# Patient Record
Sex: MALE | Race: WHITE | URBAN - METROPOLITAN AREA
[De-identification: names, ages, dates, MRNs, and addresses within clinical notes are randomized per-mention and may not be internally consistent; named-entity substitution may affect disease eponyms.]

---

## 2017-10-17 ENCOUNTER — TELEPHONE (OUTPATIENT)
Dept: PRIMARY CARE CLINIC | Age: 67
End: 2017-10-17

## 2017-10-17 NOTE — TELEPHONE ENCOUNTER
----- Message from Joan Kelley MA sent at 10/17/2017  1:34 PM EDT -----  Contact: Daniella Ryan      ----- Message -----  From: Maira Estevanad  Sent: 10/17/2017  12:54 PM  To: [facility]    Patient is calling to get established with . He is a close neighbor and  told him to come in to be seen. He has [employer]. He goes By [email or web address] and stated  would know him by this name. He is hoping to get in sooner then later. He has had a chronic Sinus infection now accompanying headaches that don't seem to want to go away. He is not really sure what's going on. Can this patient establish?

## 2017-10-27 ENCOUNTER — OFFICE VISIT (OUTPATIENT)
Dept: PRIMARY CARE CLINIC | Age: 67
End: 2017-10-27

## 2017-10-27 VITALS
OXYGEN SATURATION: 98 % | DIASTOLIC BLOOD PRESSURE: 80 MMHG | WEIGHT: 205 LBS | SYSTOLIC BLOOD PRESSURE: 120 MMHG | HEART RATE: 73 BPM | HEIGHT: 73 IN | BODY MASS INDEX: 27.17 KG/M2

## 2017-10-27 DIAGNOSIS — J32.2 CHRONIC ETHMOIDAL SINUSITIS: ICD-10-CM

## 2017-10-27 DIAGNOSIS — Z12.11 COLON CANCER SCREENING: ICD-10-CM

## 2017-10-27 DIAGNOSIS — Z00.00 MEDICARE ANNUAL WELLNESS VISIT, INITIAL: Primary | ICD-10-CM

## 2017-10-27 DIAGNOSIS — Z87.891 FORMER SMOKER: ICD-10-CM

## 2017-10-27 LAB
A/G RATIO: 1.4 (ref 1.1–2.2)
ALBUMIN SERPL-MCNC: 4.2 G/DL (ref 3.4–5)
ALP BLD-CCNC: 121 U/L (ref 40–129)
ALT SERPL-CCNC: 33 U/L (ref 10–40)
ANION GAP SERPL CALCULATED.3IONS-SCNC: 14 MMOL/L (ref 3–16)
AST SERPL-CCNC: 26 U/L (ref 15–37)
BILIRUB SERPL-MCNC: 0.4 MG/DL (ref 0–1)
BUN BLDV-MCNC: 22 MG/DL (ref 7–20)
CALCIUM SERPL-MCNC: 9.7 MG/DL (ref 8.3–10.6)
CHLORIDE BLD-SCNC: 102 MMOL/L (ref 99–110)
CHOLESTEROL, TOTAL: 191 MG/DL (ref 0–199)
CO2: 26 MMOL/L (ref 21–32)
CREAT SERPL-MCNC: 1.1 MG/DL (ref 0.8–1.3)
GFR AFRICAN AMERICAN: >60
GFR NON-AFRICAN AMERICAN: >60
GLOBULIN: 2.9 G/DL
GLUCOSE BLD-MCNC: 82 MG/DL (ref 70–99)
HDLC SERPL-MCNC: 50 MG/DL (ref 40–60)
LDL CHOLESTEROL CALCULATED: 125 MG/DL
POTASSIUM SERPL-SCNC: 4.8 MMOL/L (ref 3.5–5.1)
PROSTATE SPECIFIC ANTIGEN: 3.41 NG/ML (ref 0–4)
SODIUM BLD-SCNC: 142 MMOL/L (ref 136–145)
TOTAL PROTEIN: 7.1 G/DL (ref 6.4–8.2)
TRIGL SERPL-MCNC: 81 MG/DL (ref 0–150)
VLDLC SERPL CALC-MCNC: 16 MG/DL

## 2017-10-27 PROCEDURE — G0402 INITIAL PREVENTIVE EXAM: HCPCS | Performed by: INTERNAL MEDICINE

## 2017-10-27 RX ORDER — OMEGA-3 FATTY ACIDS/FISH OIL 300-1000MG
1 CAPSULE ORAL
COMMUNITY

## 2017-10-27 NOTE — PROGRESS NOTES
Medicare Annual Wellness Visit       Drake Taylor  YOB: 1950    Date of Service:  10/27/2017    There is no problem list on file for this patient. Allergies not on file  No outpatient prescriptions have been marked as taking for the 10/27/17 encounter (Office Visit) with Lisa Macdonald MD.       No past medical history on file. No past surgical history on file. No family history on file. Social History     Social History    Marital status: Single     Spouse name: N/A    Number of children: N/A    Years of education: N/A     Occupational History    Not on file. Social History Main Topics    Smoking status: Never Smoker    Smokeless tobacco: Never Used    Alcohol use No    Drug use: No    Sexual activity: No     Other Topics Concern    Not on file     Social History Narrative    No narrative on file       Consultants:   No care team member to [address] [place] [address] form completed by patient, reviewed and scanned into chart. Summary of HRA, and behavioral, psychosocial, cognitive and functional/safety screening results are documented in additional note within this encounter. Wt Readings from Last 3 Encounters:   10/27/17 205 lb (93 kg)     BP Readings from Last 3 Encounters:   No data found for BP       Pertinent Physical Exam    Vitals:    10/27/17 1017   Weight: 205 lb (93 kg)   Height: 6' 1\" (1.854 m)     Body mass index is 27.05 kg/m².    General Appearance: alert and oriented to person, place and time, well-developed and well-nourished, in no acute distress  Skin: warm and dry, no rash or erythema  Head: normocephalic and atraumatic  Eyes: pupils equal, round, and reactive to light, extraocular eye movements intact, conjunctivae normal  ENT: tympanic membrane, external ear and ear canal normal bilaterally, oropharynx clear and moist with normal mucous membranes  Neck: neck supple and non tender without mass, no thyromegaly or thyroid nodules, no cervical

## 2017-10-30 ENCOUNTER — TELEPHONE (OUTPATIENT)
Dept: PRIMARY CARE CLINIC | Age: 67
End: 2017-10-30

## 2017-10-30 DIAGNOSIS — E78.00 HYPERCHOLESTEROLEMIA: ICD-10-CM

## 2017-10-30 NOTE — TELEPHONE ENCOUNTER
Pt informed, he would like to try the diet and exercise a little longer and see if that will help. But will keep the medication in mind.

## 2017-10-30 NOTE — TELEPHONE ENCOUNTER
Please notify labs were normal except for cholesterol was mildly elevated  He should continue 2.5 hours moderate intensity exercise weekly, and  diet low in saturated fat and processed foods which he already eats. When I calculate his 10 year risk of having heart a heart problem with this cholesterol level, his risk is 11%  I would actually recommend a cholesterol medication if he is willing.

## 2019-09-18 RX ORDER — ONDANSETRON 2 MG/ML
4 INJECTION INTRAMUSCULAR; INTRAVENOUS ONCE
Status: CANCELLED | OUTPATIENT
Start: 2019-09-18 | End: 2019-09-18

## 2019-09-18 RX ORDER — LIDOCAINE HYDROCHLORIDE 10 MG/ML
30 INJECTION, SOLUTION EPIDURAL; INFILTRATION; INTRACAUDAL; PERINEURAL ONCE
Status: CANCELLED | OUTPATIENT
Start: 2019-09-18 | End: 2019-09-18

## 2019-09-18 RX ORDER — 0.9 % SODIUM CHLORIDE 0.9 %
500 INTRAVENOUS SOLUTION INTRAVENOUS ONCE
Status: CANCELLED | OUTPATIENT
Start: 2019-09-18 | End: 2019-09-18

## 2019-09-18 RX ORDER — BUPIVACAINE HYDROCHLORIDE 5 MG/ML
30 INJECTION, SOLUTION EPIDURAL; INTRACAUDAL ONCE
Status: CANCELLED | OUTPATIENT
Start: 2019-09-18 | End: 2019-09-18

## 2019-09-20 ENCOUNTER — FOLLOWUP TELEPHONE ENCOUNTER (OUTPATIENT)
Dept: RADIATION ONCOLOGY | Age: 69
End: 2019-09-20

## 2019-09-20 ENCOUNTER — PRE-PROCEDURE TELEPHONE (OUTPATIENT)
Dept: RADIATION ONCOLOGY | Age: 69
End: 2019-09-20

## 2019-09-20 RX ORDER — DOXEPIN HYDROCHLORIDE 25 MG/1
25 CAPSULE ORAL NIGHTLY
COMMUNITY

## 2019-09-20 RX ORDER — NIFEDIPINE 30 MG/1
30 TABLET, FILM COATED, EXTENDED RELEASE ORAL DAILY
COMMUNITY

## 2019-09-20 RX ORDER — LORAZEPAM 1 MG/1
1 TABLET ORAL 2 TIMES DAILY
COMMUNITY

## 2019-09-20 RX ORDER — METOPROLOL SUCCINATE 50 MG/1
50 TABLET, EXTENDED RELEASE ORAL DAILY
COMMUNITY

## 2019-09-20 NOTE — PROGRESS NOTES
INSTRUCTIONS FOR THE DAY OF GAMMA KNIFE PROCEDURE AT THE [facility]    1. Arrive at 6:00 a.m. to register. 2.  DO NOT eat or drink anything after midnight the night before your procedure. 3.  Take all of your usual morning medications the day of the procedure with just a sip of water. 4.  If you are on any blood thinners (Coumadin, Xarelto, Aspirin, Lovenox), you MAY TAKE those medication. There is no need to stop these medications for your procedure. 5.  If you need pain medication during the night before or the morning of your procedure, you may take them with a sip of water. 6.  Bring a current list of all of your medications with you. 7.  Do not wear any make-up. 8.  Wear comfortable, loose-fitting clothing. 9.  Do not wear jewelry, belts, or any clothing that contains metal.  10.  Visitors will be limited to 2 per patient. YOU MUST HAVE SOMEONE DRIVE YOU HOME AFTER YOUR PROCEDURE. Reviewed all pre-procedure instructions with patient/family member via telephone. Answered all questions.     David Bliss

## 2019-09-23 ENCOUNTER — HOSPITAL ENCOUNTER (OUTPATIENT)
Dept: RADIATION ONCOLOGY | Age: 69
Discharge: HOME | End: 2019-09-23
Payer: MEDICARE